# Patient Record
Sex: FEMALE | Race: WHITE | HISPANIC OR LATINO | Employment: FULL TIME | URBAN - METROPOLITAN AREA
[De-identification: names, ages, dates, MRNs, and addresses within clinical notes are randomized per-mention and may not be internally consistent; named-entity substitution may affect disease eponyms.]

---

## 2017-11-07 ENCOUNTER — HOSPITAL ENCOUNTER (OUTPATIENT)
Facility: MEDICAL CENTER | Age: 57
End: 2017-11-07
Payer: COMMERCIAL

## 2017-11-07 LAB
ANION GAP SERPL CALC-SCNC: 9 MMOL/L (ref 0–11.9)
BUN SERPL-MCNC: 10 MG/DL (ref 8–22)
CALCIUM SERPL-MCNC: 9 MG/DL (ref 8.5–10.5)
CHLORIDE SERPL-SCNC: 104 MMOL/L (ref 96–112)
CHOLEST SERPL-MCNC: 158 MG/DL (ref 100–199)
CO2 SERPL-SCNC: 27 MMOL/L (ref 20–33)
CREAT SERPL-MCNC: 0.44 MG/DL (ref 0.5–1.4)
GFR SERPL CREATININE-BSD FRML MDRD: >60 ML/MIN/1.73 M 2
GLUCOSE SERPL-MCNC: 83 MG/DL (ref 65–99)
HDLC SERPL-MCNC: 54 MG/DL
LDLC SERPL CALC-MCNC: 94 MG/DL
POTASSIUM SERPL-SCNC: 3.8 MMOL/L (ref 3.6–5.5)
SODIUM SERPL-SCNC: 140 MMOL/L (ref 135–145)
TRIGL SERPL-MCNC: 52 MG/DL (ref 0–149)

## 2020-06-08 ENCOUNTER — OFFICE VISIT (OUTPATIENT)
Dept: URGENT CARE | Facility: PHYSICIAN GROUP | Age: 60
End: 2020-06-08
Payer: COMMERCIAL

## 2020-06-08 VITALS
OXYGEN SATURATION: 95 % | WEIGHT: 161 LBS | RESPIRATION RATE: 16 BRPM | HEIGHT: 60 IN | HEART RATE: 66 BPM | TEMPERATURE: 97 F | DIASTOLIC BLOOD PRESSURE: 88 MMHG | SYSTOLIC BLOOD PRESSURE: 130 MMHG | BODY MASS INDEX: 31.61 KG/M2

## 2020-06-08 DIAGNOSIS — H10.9 BACTERIAL CONJUNCTIVITIS OF RIGHT EYE: ICD-10-CM

## 2020-06-08 PROCEDURE — 99214 OFFICE O/P EST MOD 30 MIN: CPT | Performed by: PHYSICIAN ASSISTANT

## 2020-06-08 RX ORDER — POLYMYXIN B SULFATE AND TRIMETHOPRIM 1; 10000 MG/ML; [USP'U]/ML
1 SOLUTION OPHTHALMIC EVERY 4 HOURS
Qty: 10 ML | Refills: 0 | Status: SHIPPED | OUTPATIENT
Start: 2020-06-08 | End: 2022-07-05

## 2020-06-08 ASSESSMENT — ENCOUNTER SYMPTOMS
SORE THROAT: 0
EYE PAIN: 0
HEADACHES: 0
EYE REDNESS: 1
CARDIOVASCULAR NEGATIVE: 1
DOUBLE VISION: 0
FEVER: 0
PHOTOPHOBIA: 0
COUGH: 0
EYE DISCHARGE: 1

## 2020-06-08 ASSESSMENT — VISUAL ACUITY: OU: 1

## 2020-06-08 NOTE — PATIENT INSTRUCTIONS
Conjuntivitis bacteriana  (Bacterial Conjunctivitis)  La conjuntivitis bacteriana es jozef infección de la membrana transparente que cubre la parte rosendo del doreen y la saturnino interna del párpado (conjuntiva). Cuando los vasos sanguíneos de la conjuntiva se inflaman, el doreen se pone fraser o deyvi, y es posible que le pique. La conjuntivitis bacteriana se transmite fácilmente de jozef persona a la otra (es contagiosa). También se contagia fácilmente de un doreen al otro.  CAUSAS  La causa de esta afección son varias bacterias comunes. Puede contraer la infección si entra en contacto con otra persona que está infectada. También puede entrar en contacto con elementos que estén contaminados con la bacteria, gayle jozef toalla para la saturnino, solución para lentes de contacto o maquillaje para ojos.  FACTORES DE RIESGO  Es más probable que esta afección se manifieste en las personas que:  · Mantienen contacto físico con personas que tienen la infección.  · Usan lentes de contacto.  · Tienen sinusitis.  · Munoz tenido jozef lesión o cirugía reciente en el doreen.  · Tiene debilitado el sistema de defensa del organismo (sistema inmunitario).  · Tienen jozef afección médica que causa sequedad en los ojos.  SÍNTOMAS  Los síntomas de esta afección incluyen lo siguiente:  · Ojos rojos.  · Lagrimeo u ojos llorosos.  · Picazón en los ojos.  · Sensación de ardor en los ojos.  · Secreción espesa y amarillenta del doreen. Esta secreción puede convertirse en jozef costra en el párpado sandi la noche, que hace que los párpados se peguen.  · Hinchazón de los párpados.  · Visión borrosa.  DIAGNÓSTICO  El médico puede diagnosticar esta afección en función de los síntomas y los antecedentes médicos. El médico también puede obtener jozef muestra de la secreción del doreen para averiguar la causa de la infección. [place] no se hace con frecuencia.  TRATAMIENTO  El tratamiento de esta afección incluye lo siguiente:  · Gotas o ungüento para los ojos con antibiótico para erradicar  la infección con más rapidez y evitar el contagio a otras personas.  · Antibióticos por vía oral para tratar infecciones que no responden a las gotas o los ungüentos, o que plaza más de 10 días.  · Paños húmedos fríos (compresas frías) sobre los ojos.  · Lágrimas artificiales aplicadas 2 a 6 veces por día.  INSTRUCCIONES PARA EL CUIDADO EN EL HOGAR  Medicamentos   · [place] los antibióticos o aplíqueselos gayle se lo haya indicado el médico. No deje de bahman los antibióticos o de aplicárselos aunque comience a sentirse mejor.  · [place] o aplíquese los medicamentos de venta joseph y recetados solamente gayle se lo haya indicado el médico.  · Tenga mucho cuidado de no tocar el borde del párpado con el frasco de las gotas para los ojos o el tubo del ungüento cuando aplica los medicamentos en el doreen afectado. Emet evitará que se contagie la infección al otro doreen o a otras personas.  Control de las molestias   · Retire suavemente la secreción de los ojos con un paño tibio y húmedo o con jozef torunda de algodón.  · Aplíquese un paño frío y limpio en el doreen sandi 10 a 20 minutos, 3 a 4 veces al día.  Instrucciones generales   · No use lentes de contacto hasta que haya desaparecido la inflamación y mead médico le indique que es seguro usarlos nuevamente. Pregúntele al médico cómo esterilizar o reemplazar namita lentes de contacto antes de usarlos nuevamente. Use anteojos hasta que pueda volver a usar los lentes de contacto.  · Evite usar maquillaje en los ojos hasta que la inflamación se haya crissy. Descarte cosméticos viejos para los ojos que puedan estar contaminados.  · Cambie o lave mead almohada todos los días.  · No comparta las toallas o los paños. Emet puede propagar la infección.  · Lave namita shayy frecuentemente con agua y jabón. Use toallas de papel para secarse las shayy.  · Evite tocarse o frotarse los ojos.  · No conduzca ni use maquinaria pesada si mead visión es borrosa.  SOLICITE ATENCIÓN MÉDICA SI:  · Tiene fiebre.  · Los  síntomas no mejoran después de 10 días de tratamiento.  SOLICITE ATENCIÓN MÉDICA DE INMEDIATO SI:  · Tiene fiebre y los síntomas empeoran repentinamente.  · Siente dolor intenso cuando mueve el doreen.  · Siente dolor u observa hinchazón o enrojecimiento en la saturnino.  · Pierde la visión repentinamente.  Esta información no tiene gayle fin reemplazar el consejo del médico. Asegúrese de hacerle al médico cualquier pregunta que tenga.  Document Released: 09/27/2006 Document Revised: 04/10/2017 Document Reviewed: 09/29/2016  Elsevier Interactive Patient Education © 2017 [employer]

## 2020-06-08 NOTE — PROGRESS NOTES
Subjective:      Sarah Schwartz is a 59 y.o. female who presents with Eye Problem (both eye, began on wendesday with just red eyes and now they are itchy and closed shut )        HPI  Patient is a 59-year-old female who presents with bilateral eye redness, yellow drainage, and itching onset 5 to 6 days ago.  Started with her right eye which has worsened and then moved to her left eye several days ago.  Right eye is worse with worsening redness and yellow drainage.  Her left eye has mild redness and itchiness.  She has mild blurry vision because of the drainage.  Otherwise she denies any other vision changes.  Denies any headache, fevers, chills.  Mild right eye discomfort.  She denies any pain.      Review of Systems   Constitutional: Negative for fever and malaise/fatigue.   HENT: Negative.  Negative for congestion and sore throat.    Eyes: Positive for discharge and redness. Negative for double vision, photophobia and pain.   Respiratory: Negative for cough.    Cardiovascular: Negative.    Neurological: Negative for headaches.   All other systems reviewed and are negative.         Objective:     /88   Pulse 66   Temp 36.1 °C (97 °F) (Tympanic)   Resp 16   Ht 1.524 m (5')   Wt 73 kg (161 lb)   SpO2 95%   Breastfeeding No   BMI 31.44 kg/m²      Physical Exam  Vitals signs reviewed.   Constitutional:       General: She is not in acute distress.     Appearance: Normal appearance. She is not ill-appearing or toxic-appearing.   HENT:      Right Ear: Tympanic membrane normal.      Left Ear: Tympanic membrane normal.   Eyes:      General: Lids are normal. Vision grossly intact.         Right eye: Discharge present. No foreign body or hordeolum.         Left eye: No foreign body, discharge or hordeolum.      Extraocular Movements: Extraocular movements intact.      Conjunctiva/sclera:      Right eye: Right conjunctiva is injected. Exudate present.      Left eye: Left conjunctiva is injected. No exudate.      Pupils: Pupils are equal, round, and reactive to light.   Cardiovascular:      Rate and Rhythm: Normal rate.   Pulmonary:      Effort: Pulmonary effort is normal.   Skin:     General: Skin is warm and dry.   Neurological:      General: No focal deficit present.      Mental Status: She is alert and oriented to person, place, and time.   Psychiatric:         Mood and Affect: Mood normal.         Behavior: Behavior normal.       History reviewed. No pertinent past medical history. History reviewed. No pertinent surgical history.   Social History     Socioeconomic History   • Marital status:      Spouse name: Not on file   • Number of children: Not on file   • Years of education: Not on file   • Highest education level: Not on file   Occupational History   • Not on file   Social Needs   • Financial resource strain: Not on file   • Food insecurity     Worry: Not on file     Inability: Not on file   • Transportation needs     Medical: Not on file     Non-medical: Not on file   Tobacco Use   • Smoking status: Not on file   Substance and Sexual Activity   • Alcohol use: Not on file   • Drug use: Not on file   • Sexual activity: Not on file   Lifestyle   • Physical activity     Days per week: Not on file     Minutes per session: Not on file   • Stress: Not on file   Relationships   • Social connections     Talks on phone: Not on file     Gets together: Not on file     Attends Buddhism service: Not on file     Active member of club or organization: Not on file     Attends meetings of clubs or organizations: Not on file     Relationship status: Not on file   • Intimate partner violence     Fear of current or ex partner: Not on file     Emotionally abused: Not on file     Physically abused: Not on file     Forced sexual activity: Not on file   Other Topics Concern   • Not on file   Social History Narrative   • Not on file    Patient has no known allergies.            Assessment/Plan:     1. Bacterial conjunctivitis of  right eye    - polymixin-trimethoprim (POLYTRIM) 22611-8.1 UNIT/ML-% Solution; Place 1 Drop in both eyes every 4 hours.  Dispense: 10 mL; Refill: 0    Discussed with patient signs and symptoms consistent with bacterial conjunctivitis of her right eye.  Discussed mild redness to the left eye, may be allergy related.  Treatment Polytrim eyedrops.  She may try nonsedating oral antihistamine in the morning such as Claritin or Zyrtec.  She may try antihistamine eyedrops separate from her antibiotic eyedrops.     Instructed to return to the urgent care if symptoms are not improving in 2 days or sooner if any worsening symptoms such as worsening redness, drainage, fevers, chills, vision changes, pain with eye movement, swelling or any other concerns.    Discharge instructions handed to patient.    Supportive care, differential diagnoses, and indications for immediate follow-up discussed with patient.    Pathogenesis of diagnosis discussed including typical length and natural progression. Patient expresses understanding and agrees to plan.    Please note that this dictation was created using voice recognition software. I have made every reasonable attempt to correct obvious errors, but I expect that there are errors of grammar and possibly content that I did not discover before finalizing the note.

## 2020-06-09 ENCOUNTER — OFFICE VISIT (OUTPATIENT)
Dept: URGENT CARE | Facility: PHYSICIAN GROUP | Age: 60
End: 2020-06-09
Payer: COMMERCIAL

## 2020-06-09 VITALS
TEMPERATURE: 97.7 F | OXYGEN SATURATION: 96 % | HEART RATE: 55 BPM | HEIGHT: 60 IN | SYSTOLIC BLOOD PRESSURE: 160 MMHG | WEIGHT: 160 LBS | DIASTOLIC BLOOD PRESSURE: 78 MMHG | RESPIRATION RATE: 16 BRPM | BODY MASS INDEX: 31.41 KG/M2

## 2020-06-09 DIAGNOSIS — S86.912A KNEE STRAIN, LEFT, INITIAL ENCOUNTER: ICD-10-CM

## 2020-06-09 PROCEDURE — 99214 OFFICE O/P EST MOD 30 MIN: CPT | Performed by: NURSE PRACTITIONER

## 2020-06-09 ASSESSMENT — ENCOUNTER SYMPTOMS
BACK PAIN: 0
DIZZINESS: 0
CHILLS: 0
NAUSEA: 1
HEARTBURN: 1
FOCAL WEAKNESS: 0
FEVER: 0
TINGLING: 0
FALLS: 0

## 2020-06-09 NOTE — PROGRESS NOTES
Subjective:   Sarah Schwartz  is a 59 y.o. female who presents for Knee Pain (L Knee pain, difficulty bending knee, unable to apply pressure, x5 days )        Knee Pain   This is a new problem. Episode onset: 4 months ago. The problem occurs intermittently. The problem has been gradually worsening. Associated symptoms include nausea. Pertinent negatives include no chills or fever. Associated symptoms comments: 59-year-old female patient reports to urgent care for new problem that started about 4 months ago.  Patient states that she twisted her left ankle while walking and has been babying that ankle causing some left knee pain.  Denies any mechanism of injury, fall, click or pop at the time.  Patient states that she does have more of a difficult time bending it and applying pressure to the area.  Is currently not taking anything over-the-counter for symptoms.  Denies any numbness or tingling denies any instability.  Patient states that she does occasionally feel a pop while walking down stairs in her left knee..     Review of Systems   Constitutional: Positive for malaise/fatigue. Negative for chills and fever.   Gastrointestinal: Positive for heartburn and nausea.   Musculoskeletal: Positive for joint pain. Negative for back pain and falls.   Neurological: Negative for dizziness, tingling and focal weakness.     History reviewed. No pertinent past medical history. History reviewed. No pertinent surgical history.   Social History     Socioeconomic History   • Marital status:      Spouse name: Not on file   • Number of children: Not on file   • Years of education: Not on file   • Highest education level: Not on file   Occupational History   • Not on file   Social Needs   • Financial resource strain: Not on file   • Food insecurity     Worry: Not on file     Inability: Not on file   • Transportation needs     Medical: Not on file     Non-medical: Not on file   Tobacco Use   • Smoking status: Never Smoker   •  Smokeless tobacco: Never Used   Substance and Sexual Activity   • Alcohol use: Not Currently   • Drug use: Not on file   • Sexual activity: Not on file   Lifestyle   • Physical activity     Days per week: Not on file     Minutes per session: Not on file   • Stress: Not on file   Relationships   • Social connections     Talks on phone: Not on file     Gets together: Not on file     Attends Taoist service: Not on file     Active member of club or organization: Not on file     Attends meetings of clubs or organizations: Not on file     Relationship status: Not on file   • Intimate partner violence     Fear of current or ex partner: Not on file     Emotionally abused: Not on file     Physically abused: Not on file     Forced sexual activity: Not on file   Other Topics Concern   • Not on file   Social History Narrative   • Not on file    Patient has no known allergies.       Objective:   /78 (BP Location: Left arm, Patient Position: Sitting, BP Cuff Size: Adult)   Pulse (!) 55   Temp 36.5 °C (97.7 °F) (Temporal)   Resp 16   Ht 1.524 m (5')   Wt 72.6 kg (160 lb)   SpO2 96%   BMI 31.25 kg/m²   Physical Exam  Vitals signs reviewed.   Constitutional:       Appearance: Normal appearance.   Cardiovascular:      Rate and Rhythm: Normal rate and regular rhythm.      Heart sounds: Normal heart sounds.   Pulmonary:      Effort: Pulmonary effort is normal.      Breath sounds: Normal breath sounds.   Musculoskeletal:      Left knee: She exhibits decreased range of motion. She exhibits no swelling, no effusion, no ecchymosis, no deformity, no laceration, no erythema, normal alignment, no LCL laxity, no bony tenderness, normal meniscus and no MCL laxity. Tenderness found. Medial joint line tenderness noted. No lateral joint line and no patellar tendon tenderness noted.      Comments: Left knee:   - Pain with valgus stress, no pain with varus stress  - No laxity noted  - No STS, bony tenderness, crepitus or bony  tenderness noted  - Anterior drawer negative   Skin:     General: Skin is warm.      Capillary Refill: Capillary refill takes less than 2 seconds.   Neurological:      Mental Status: She is alert and oriented to person, place, and time.   Psychiatric:         Mood and Affect: Mood normal.         Behavior: Behavior normal.         Thought Content: Thought content normal.         Judgment: Judgment normal.           Assessment/Plan:        1. Knee strain, left, initial encounter  - REFERRAL TO SPORTS MEDICINE    Discussed physical examination findings are consistent with a left knee strain due to to a left ankle sprain.  Will provide patient with a hinged knee brace for support and instructed her on RICE therapy including rest, ice, compression and elevation.  Provided patient with work note for the next 3 days and referral to sports medicine for further work-up and evaluation.  We will also provide patient an Ace wrap for her ankle as sometimes she feels like it is a little bit weak but is not currently having pain.    Supportive care, differential diagnoses, and indications for immediate follow-up discussed with patient.    Pathogenesis of diagnosis discussed including typical length and natural progression. Patient expresses understanding and agrees to plan.    Instructed patient to return to clinic for worsening symptoms or symptoms that persist for 7 to 10 days     Please note that this dictation was created using voice recognition software. I have made every reasonable attempt to correct obvious errors, but I expect that there are errors of grammar and possibly content that I did not discover before finalizing the note.

## 2020-06-09 NOTE — LETTER
June 9, 2020         Patient: Sarah Schwartz   YOB: 1960   Date of Visit: 6/9/2020           To Whom it May Concern:    Sarah Schwartz was seen in my clinic on 6/9/2020. She may return to work on 06/12/2020    If you have any questions or concerns, please don't hesitate to call.        Sincerely,           TEX Tiwari.  Electronically Signed

## 2020-06-15 ENCOUNTER — OFFICE VISIT (OUTPATIENT)
Dept: URGENT CARE | Facility: PHYSICIAN GROUP | Age: 60
End: 2020-06-15
Payer: COMMERCIAL

## 2020-06-15 VITALS
TEMPERATURE: 98.1 F | OXYGEN SATURATION: 96 % | HEART RATE: 56 BPM | WEIGHT: 167.2 LBS | BODY MASS INDEX: 32.83 KG/M2 | DIASTOLIC BLOOD PRESSURE: 88 MMHG | RESPIRATION RATE: 16 BRPM | HEIGHT: 60 IN | SYSTOLIC BLOOD PRESSURE: 158 MMHG

## 2020-06-15 DIAGNOSIS — Z86.69 HISTORY OF CATARACT: ICD-10-CM

## 2020-06-15 DIAGNOSIS — H10.13 ALLERGIC CONJUNCTIVITIS OF BOTH EYES: ICD-10-CM

## 2020-06-15 PROCEDURE — 99214 OFFICE O/P EST MOD 30 MIN: CPT | Performed by: NURSE PRACTITIONER

## 2020-06-15 RX ORDER — OLOPATADINE HYDROCHLORIDE 2 MG/ML
1 SOLUTION/ DROPS OPHTHALMIC DAILY
Qty: 2.5 ML | Refills: 0 | Status: SHIPPED | OUTPATIENT
Start: 2020-06-15 | End: 2022-07-05

## 2020-06-15 RX ORDER — CETIRIZINE HYDROCHLORIDE 10 MG/1
10 TABLET ORAL DAILY
Qty: 30 TAB | Refills: 0 | Status: SHIPPED | OUTPATIENT
Start: 2020-06-15 | End: 2022-07-05

## 2020-06-15 ASSESSMENT — ENCOUNTER SYMPTOMS
NAUSEA: 0
EYE DISCHARGE: 1
EYE ITCHING: 1
FEVER: 0
EYE REDNESS: 1
VOMITING: 0
FOREIGN BODY SENSATION: 1
DOUBLE VISION: 0
BLURRED VISION: 0
PHOTOPHOBIA: 0

## 2020-06-16 ASSESSMENT — ENCOUNTER SYMPTOMS
SHORTNESS OF BREATH: 0
WHEEZING: 0

## 2020-06-16 NOTE — PROGRESS NOTES
Subjective:     Sarah Schwartz is a 59 y.o. female who presents for Eye Problem (L eye redness, R eye feels like something is in eye, x2 days )      Redness started last week in the right eye. Seen on Monday and started eye drops. States the medication were working well, then redness started in left eye on Saturday. No vision changes. Eyes are itchy. States it flet like sand in her eye right eye. Medications helped discharge. Eye redness started when she started working a new job. 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 Irritated by cig smoke, initially was sneezing a lot. No hx of seasonal allergies.     uses for exam and discharge instructions.     Eye Problem    Both eyes are affected.This is a new problem. There was no injury mechanism. There is no known exposure to pink eye. She does not wear contacts. Associated symptoms include an eye discharge, eye redness, a foreign body sensation and itching. Pertinent negatives include no blurred vision, double vision, fever, nausea, photophobia, recent URI or vomiting. She has tried eye drops for the symptoms. The treatment provided mild relief.       History reviewed. No pertinent past medical history.    History reviewed. No pertinent surgical history.    Social History     Socioeconomic History   • Marital status:      Spouse name: Not on file   • Number of children: Not on file   • Years of education: Not on file   • Highest education level: Not on file   Occupational History   • Not on file   Social Needs   • Financial resource strain: Not on file   • Food insecurity     Worry: Not on file     Inability: Not on file   • Transportation needs     Medical: Not on file     Non-medical: Not on file   Tobacco Use   • Smoking status: Never Smoker   • Smokeless tobacco: Never Used   Substance and Sexual Activity   • Alcohol use: Not Currently   • Drug use: Not on file   • Sexual activity: Not on file   Lifestyle   •  Physical activity     Days per week: Not on file     Minutes per session: Not on file   • Stress: Not on file   Relationships   • Social connections     Talks on phone: Not on file     Gets together: Not on file     Attends Buddhism service: Not on file     Active member of club or organization: Not on file     Attends meetings of clubs or organizations: Not on file     Relationship status: Not on file   • Intimate partner violence     Fear of current or ex partner: Not on file     Emotionally abused: Not on file     Physically abused: Not on file     Forced sexual activity: Not on file   Other Topics Concern   • Not on file   Social History Narrative   • Not on file        History reviewed. No pertinent family history.     No Known Allergies    Review of Systems   Constitutional: Negative for fever.   HENT: Positive for congestion.    Eyes: Positive for discharge, redness and itching. Negative for blurred vision, double vision and photophobia.   Respiratory: Negative for shortness of breath and wheezing.    Gastrointestinal: Negative for nausea and vomiting.   Musculoskeletal: Positive for joint pain.   Skin: Negative for rash.   Endo/Heme/Allergies: Negative for environmental allergies.   All other systems reviewed and are negative.       Objective:   /88 (BP Location: Left arm, Patient Position: Sitting, BP Cuff Size: Adult)   Pulse (!) 56   Temp 36.7 °C (98.1 °F) (Temporal)   Resp 16   Ht 1.524 m (5')   Wt 75.8 kg (167 lb 3.2 oz)   SpO2 96%   BMI 32.65 kg/m²     Physical Exam  Vitals signs reviewed.   Constitutional:       General: She is not in acute distress.     Appearance: She is well-developed.   HENT:      Head: Normocephalic and atraumatic.      Right Ear: External ear normal.      Left Ear: External ear normal.      Nose: Mucosal edema present.      Mouth/Throat:      Mouth: Mucous membranes are moist.   Eyes:      General: Lids are normal.         Right eye: No foreign body or discharge.          Left eye: No foreign body or discharge.      Extraocular Movements: Extraocular movements intact.      Right eye: Normal extraocular motion and no nystagmus.      Left eye: Normal extraocular motion and no nystagmus.      Conjunctiva/sclera:      Right eye: Right conjunctiva is injected. No chemosis, exudate or hemorrhage.     Left eye: Left conjunctiva is injected. No chemosis, exudate or hemorrhage.     Pupils: Pupils are equal, round, and reactive to light. Pupils are equal.      Right eye: No corneal abrasion or fluorescein uptake.      Left eye: No corneal abrasion or fluorescein uptake.      Comments: Cataract rt eye. Greatest injection to lower sclera. Clear limbus bilaterally.     Snyder lamp exam showed no fluorecein uptake.   Neck:      Musculoskeletal: Normal range of motion.   Cardiovascular:      Rate and Rhythm: Normal rate.   Pulmonary:      Effort: Pulmonary effort is normal.   Musculoskeletal: Normal range of motion.      Left knee: Tenderness found.      Comments: Left knee brace.    Skin:     General: Skin is warm and dry.      Findings: No rash.   Neurological:      General: No focal deficit present.      Mental Status: She is alert and oriented to person, place, and time.      GCS: GCS eye subscore is 4. GCS verbal subscore is 5. GCS motor subscore is 6.   Psychiatric:         Mood and Affect: Mood normal.         Speech: Speech normal.         Behavior: Behavior normal.         Thought Content: Thought content normal.         Judgment: Judgment normal.         Assessment/Plan:   1. Allergic conjunctivitis of both eyes  - REFERRAL TO FOLLOW-UP WITH PRIMARY CARE  - cetirizine (ZYRTEC ALLERGY) 10 MG Tab; Take 1 Tab by mouth every day.  Dispense: 30 Tab; Refill: 0  - olopatadine HCl (PATADAY) 0.2 % ophthalmic solution; Place 1 Drop in both eyes every day.  Dispense: 2.5 mL; Refill: 0  - REFERRAL TO OPHTHALMOLOGY    2. History of cataract  - REFERRAL TO OPHTHALMOLOGY  -Visual acuity  20/25  -Eyelid and hand hygiene.  -Artificial tears to moisturize eyes.    Follow up for persistent or worsening symptoms, increased redness or swelling, vision changes, decreased eye movement, new or increased pain, or fever. Can continue antibiotic drops in rt eye x 5 days. Follow up with primary care provider. Follow up with previous sport's medicine referral for left knee pain.     Differential diagnosis, natural history, supportive care, and indications for immediate follow-up discussed.

## 2020-06-16 NOTE — PATIENT INSTRUCTIONS
Conjuntivitis alérgica  (Allergic Conjunctivitis)  La conjuntivitis alérgica es la inflamación de la membrana transparente que cubre la parte rosendo del odreen y la saturnino interna del párpado (conjuntiva), y mead causa son las alergias. Los vasos sanguíneos de la conjuntiva se inflaman, lo que hace que el doreen se torne de color frsaer o deyvi, y a menudo causa picazón en el doreen. La conjuntivitis alérgica no se transmite de jozef persona a la otra (no es contagiosa).  CAUSAS  La causa de esta afección es jozef reacción alérgica. Entre las causas comunes de jozef reacción alérgica (alérgenos) se incluyen las siguientes:  · Polvo.  · Polen.  · Moho.  · Caspa o secreciones de los animales.  FACTORES DE RIESGO  Es más probable que aparezca esta afección si está expuesto a altos niveles de los alérgenos que causan la reacción alérgica. [employer] puede incluir estar al aire joseph cuando los niveles de polen en el aire son elevados o cerca de los animales a los cuales es alérgico.  SÍNTOMAS  Los síntomas de esta afección pueden incluir lo siguiente:  · Enrojecimiento ocular.  · Secreción lagrimal de los ojos.  · Ojos llorosos.  · Picazón de los ojos.  · Sensación de ardor en los ojos.  · Secreción transparente de los ojos.  · Hinchazón de los párpados.  DIAGNÓSTICO  Purnima trastorno se puede diagnosticar mediante la historia clínica y un examen físico. Si tiene secreción de los ojos, se la puede analizar para descartar otras causas de la conjuntivitis.  TRATAMIENTO  El tratamiento para esta afección suele incluir medicamentos, que pueden ser gotas oftálmicas, ungüentos o medicamentos por vía oral. Pueden ser recetados o de venta joseph.  INSTRUCCIONES PARA EL CUIDADO EN EL HOGAR  · [place] o aplíquese los medicamentos solamente gayle se lo haya indicado el médico.  · No se toque ni se frote los ojos.  · No use lentes de contacto hasta que la inflamación haya desaparecido. En cambio, use anteojos.  · No use maquillaje en los ojos hasta que la  "inflamación haya desaparecido.  · Aplíquese un paño limpio y frío en el doreen sandi 10 a 20 minutos, 3 a 4 veces por día.  · Trate de evitar el alérgeno que le esté causando la reacción alérgica.  SOLICITE ATENCIÓN MÉDICA SI:  · Los síntomas empeoran.  · Le supura pus del doreen.  · Aparecen nuevos síntomas.  · Tiene fiebre.  Esta información no tiene anastasiia fin reemplazar el consejo del médico. Asegúrese de hacerle al médico cualquier pregunta que tenga.  Document Released: 12/18/2006 Document Revised: 01/08/2016  [employer] Interactive Patient Education © 2017 [employer]  Conjuntivitis  (Conjunctivitis)  Usted padece conjuntivitis. La conjuntivitis se conoce frecuentemente anastasiia \"doreen fraser\". Las causas de la conjuntivitis pueden ser las infecciones virales o bacterianas, alergias o lesiones. Los síntomas son: enrojecimiento de la superficie del doreen, picazón, molestias y en algunos casos, secreciones. La secreción se deposita en las pestañas. Las infecciones virales causan jozef secreción acuosa, mientras que las infecciones bacterianas causan jozef secreción amarillenta y espesa. La conjuntivitis es muy contagiosa y se disemina por el contacto directo.  Anastasiia parte del tratamiento le indicaran gotas oftálmicas con antibióticos. Antes de utilizar el medicamento, retire todas la secreciones del doreen, lavándolo suavemente con agua tibia y algodón. Continúe con el uso del medicamento hasta que se haya despertado dos mañanas sin secreción ocular. No se frote los ojos. [place] hace que aumente la irritación y favorece la extensión de la infección. No utilice las mismas toallas que los miembros de mead bhavana. Lávese las shayy con agua y jabón antes y después de tocarse los ojos. Utilice compresas frías para reducir el dolor y anteojos de sol para disminuir la irritación que ocasiona la denise. No debe usarse maquillaje ni lentes de contacto hasta que la infección haya desaparecido.  SOLICITE ATENCIÓN MÉDICA SI:  · Priscila síntomas no mejoran " luego de 3 días de tratamiento.  · Aumenta el dolor o las dificultades para elsie.  · La dulce externa de los párpados está muy arik o hinchada.  Document Released: 12/18/2006 Document Revised: 03/11/2013  [employer]® Patient Information ©2014 [employer].    Rinitis alérgica  (Allergic Rhinitis)  La rinitis alérgica ocurre cuando las membranas mucosas de la nariz responden a los alérgenos. Los alérgenos son las partículas que están en el aire y que hacen que el cuerpo tenga jozef reacción alérgica. [place] hace que usted libere anticuerpos alérgicos. A través de jozef crandall de eventos, estos finalmente hacen que usted libere histamina en la corriente sanguínea. Aunque la función de la histamina es proteger al organismo, es esta liberación de histamina lo que provoca malestar, gayle los estornudos frecuentes, la congestión y goteo y picazón nasales.  CAUSAS  La causa de la rinitis alérgica estacional (fiebre del heno) son los alérgenos del polen que pueden provenir del césped, los árboles y la maleza. La causa de la rinitis alérgica permanente (rinitis alérgica perenne) son los alérgenos, gayle los ácaros del polvo doméstico, la caspa de las mascotas y las esporas del moho.  SÍNTOMAS  · Secreción nasal (congestión).  · Goteo y picazón nasales con estornudos y lagrimeo.  DIAGNÓSTICO  Mead médico puede ayudarlo a determinar el alérgeno o los alérgenos que desencadenan namita síntomas. Si usted y mead médico no pueden determinar cuál es el alérgeno, pueden hacerse análisis de marty o estudios de la piel. El médico diagnosticará la afección después de hacerle jozef historia clínica y un examen físico. Además, puede evaluarlo para detectar la presencia de otras enfermedades afines, gayle asma, conjuntivitis u otitis.  TRATAMIENTO  La rinitis alérgica no tiene gilberto, jomar puede controlarse con lo siguiente:  · Medicamentos que inhiben los síntomas de alergia, por ejemplo, vacunas contra la alergia, aerosoles nasales y antihistamínicos por vía  oral.  · Evitar el alérgeno.  La fiebre del heno a menudo puede tratarse con antihistamínicos en las formas de píldoras o aerosol nasal. Los antihistamínicos bloquean los efectos de la histamina. Existen medicamentos de venta joseph que pueden ayudar con la congestión nasal y la hinchazón alrededor de los ojos. Consulte a mead médico antes de bahman o administrarse lisa medicamento.  Si la prevención del alérgeno o el medicamento recetado no dan resultado, existen muchos medicamentos nuevos que mead médico puede recetarle. Pueden usarse medicamentos más pilar si las medidas iniciales no son efectivas. Pueden aplicarse inyecciones desensibilizantes si los medicamentos y la prevención no funcionan. La desensibilización ocurre cuando un paciente recibe vacunas constantes hasta que el cuerpo se vuelve menos sensible al alérgeno. Asegúrese de realizar un seguimiento con mead médico si los problemas continúan.  INSTRUCCIONES PARA EL CUIDADO EN EL HOGAR  No es posible evitar por completo los alérgenos, jomar puede reducir los síntomas al bahman medidas para limitar mead exposición a ellos. Es muy útil saber exactamente a qué es alérgico para que pueda evitar namita desencadenantes específicos.  SOLICITE ATENCIÓN MÉDICA SI:  · Tiene fiebre.  · Desarrolla jozef tos que no cesa fácilmente (persistente).  · Le falta el aire.  · Comienza a tener sibilancias.  · Los síntomas interfieren con las actividades diarias normales.  Esta información no tiene gayle fin reemplazar el consejo del médico. Asegúrese de hacerle al médico cualquier pregunta que tenga.  Document Released: 09/27/2006 Document Revised: 01/08/2016 Document Reviewed: 08/25/2014  [employer]vier Interactive Patient Education © 2017 [employer]vier Inc.

## 2020-07-31 ENCOUNTER — OFFICE VISIT (OUTPATIENT)
Dept: URGENT CARE | Facility: PHYSICIAN GROUP | Age: 60
End: 2020-07-31
Payer: COMMERCIAL

## 2020-07-31 ENCOUNTER — HOSPITAL ENCOUNTER (OUTPATIENT)
Facility: MEDICAL CENTER | Age: 60
End: 2020-07-31
Attending: PHYSICIAN ASSISTANT
Payer: COMMERCIAL

## 2020-07-31 VITALS
OXYGEN SATURATION: 98 % | RESPIRATION RATE: 16 BRPM | TEMPERATURE: 97.9 F | BODY MASS INDEX: 32.79 KG/M2 | DIASTOLIC BLOOD PRESSURE: 82 MMHG | HEART RATE: 78 BPM | WEIGHT: 167 LBS | HEIGHT: 60 IN | SYSTOLIC BLOOD PRESSURE: 138 MMHG

## 2020-07-31 DIAGNOSIS — R39.89 SUSPECTED UTI: ICD-10-CM

## 2020-07-31 DIAGNOSIS — H10.12 ACUTE ALLERGIC CONJUNCTIVITIS OF LEFT EYE: ICD-10-CM

## 2020-07-31 DIAGNOSIS — H11.32 SUBCONJUNCTIVAL HEMORRHAGE, LEFT: ICD-10-CM

## 2020-07-31 LAB
APPEARANCE UR: NORMAL
BILIRUB UR STRIP-MCNC: NORMAL MG/DL
COLOR UR AUTO: YELLOW
GLUCOSE UR STRIP.AUTO-MCNC: NORMAL MG/DL
KETONES UR STRIP.AUTO-MCNC: NORMAL MG/DL
LEUKOCYTE ESTERASE UR QL STRIP.AUTO: NORMAL
NITRITE UR QL STRIP.AUTO: NORMAL
PH UR STRIP.AUTO: 7.5 [PH] (ref 5–8)
PROT UR QL STRIP: NORMAL MG/DL
RBC UR QL AUTO: NORMAL
SP GR UR STRIP.AUTO: 1.01
UROBILINOGEN UR STRIP-MCNC: 0.2 MG/DL

## 2020-07-31 PROCEDURE — 99000 SPECIMEN HANDLING OFFICE-LAB: CPT | Performed by: PHYSICIAN ASSISTANT

## 2020-07-31 PROCEDURE — 87086 URINE CULTURE/COLONY COUNT: CPT

## 2020-07-31 PROCEDURE — 87186 SC STD MICRODIL/AGAR DIL: CPT

## 2020-07-31 PROCEDURE — 87077 CULTURE AEROBIC IDENTIFY: CPT

## 2020-07-31 PROCEDURE — 99214 OFFICE O/P EST MOD 30 MIN: CPT | Performed by: PHYSICIAN ASSISTANT

## 2020-07-31 PROCEDURE — 81002 URINALYSIS NONAUTO W/O SCOPE: CPT | Performed by: PHYSICIAN ASSISTANT

## 2020-07-31 RX ORDER — NITROFURANTOIN 25; 75 MG/1; MG/1
100 CAPSULE ORAL EVERY 12 HOURS
Qty: 10 CAP | Refills: 0 | Status: SHIPPED | OUTPATIENT
Start: 2020-07-31 | End: 2020-08-05

## 2020-07-31 RX ORDER — PHENAZOPYRIDINE HYDROCHLORIDE 200 MG/1
200 TABLET, FILM COATED ORAL 3 TIMES DAILY
Qty: 6 TAB | Refills: 0 | Status: SHIPPED | OUTPATIENT
Start: 2020-07-31 | End: 2020-08-02

## 2020-07-31 ASSESSMENT — ENCOUNTER SYMPTOMS
EYE DISCHARGE: 0
DIARRHEA: 0
EYE PAIN: 0
DOUBLE VISION: 0
VOMITING: 0
CHILLS: 0
NAUSEA: 0
FEVER: 0
SORE THROAT: 0
PHOTOPHOBIA: 0
EYE REDNESS: 1
ABDOMINAL PAIN: 0
COUGH: 0
FLANK PAIN: 0
BLURRED VISION: 0

## 2020-07-31 ASSESSMENT — VISUAL ACUITY: OU: 1

## 2020-07-31 NOTE — PATIENT INSTRUCTIONS
Infección urinaria en los adultos  Urinary Tract Infection, Adult  Jozef infección urinaria (IU) puede ocurrir en cualquier lugar de las vías urinarias. Las vías urinarias incluyen lo siguiente:  · Los riñones.  · Los uréteres.  · La vejiga.  · La uretra.  Estos órganos fabrican, almacenan y eliminan el pis (orina) del cuerpo.  ¿Cuáles son las causas?  La causa es la presencia de gérmenes (bacterias) en la dulce genital. Estos gérmenes proliferan y causan hinchazón (inflamación) de las vías urinarias.  ¿Qué incrementa el riesgo?  Es más probable que contraiga esta afección si:  · Tiene colocado un tubo richardson y pequeño (catéter) para drenar el pis.  · No puede controlar la evacuación de pis ni de materia fecal (incontinencia).  · Es kristi y, además:  ? Usa estos métodos para evitar el embarazo:  ? Un medicamento que johnson los espermatozoides (espermicida).  ? Un dispositivo que impide el paso de los espermatozoides (diafragma).  ? Tiene niveles bajos de jozef hormona femenina (estrógeno).  ? Está embarazada.  · Tiene genes que aumentan mead riesgo.  · Es sexualmente activa.  · Rosenda antibióticos.  · Tiene dificultad para orinar debido a:  ? Mead próstata es más mira de lo normal, si usted es hombre.  ? Obstrucción en la parte del cuerpo que drena el pis de la vejiga (uretra).  ? Cálculo renal.  ? Un trastorno nervioso que afecta la vejiga (vejiga neurógena).  ? No adam jozef cantidad suficiente de líquido.  ? No hace pis con la frecuencia suficiente.  · Tiene otras afecciones, gayle:  ? Diabetes.  ? Un sistema que combate las enfermedades (sistema inmunitario) debilitado.  Hemorragia Subconjuntival  (Subconjunctival Hemorrhage)  Mead examen muestra que tiene jozef hemorragia subconjuntival. Es jozef acumulación de marty que cubre jozef parte de la dulce rosendo del doreen, y que no es de importancia. Purnima trastorno puede deberse a jozef lesión o esfuerzo (gayle al levantar peso, estornudar, o toser); a menudo no se puede conocer la  causa. La hemorragia subconjuntival no causa dolor ni problemas de visión.  Purnima trastorno no requiere ningún tratamiento. Llevará de 1 a 2 semanas para que la marty se disuelva. Si susan aspirina o coumadina a diario o si tiene feroz presión arterial, deberá consultar con mead médico acerca de la necesidad de realizar un mayor tratamiento. Comuníquese con mead médico si tiene problemas de visión, dolor alrededor del doreen, u otra consulta acerca de mead trastorno.  Document Released: 10/14/2008 Document Revised: 03/11/2013  [employer]® Patient Information ©2014 [employer].

## 2020-07-31 NOTE — PROGRESS NOTES
Subjective:   Sarah Schwartz  is a 59 y.o. female who presents for Eye Problem (began yesterday with a red eye possible pinkeye again ) and UTI (began yesterday, burning sensation when urinating )    Entire visit conducted with assistance of official  MACKENZIE John MA    This is a new problem. Patient presents to urgent care with 2 separate issues of concern:    1. Possible UTI: Patient reports 2-day history of dysuria, urgency and frequency with urination.  Denies hematuria or flank pain.  Denies fever or chills.  Patient does have prior history of UTI, no prior history of pyelonephritis.      2. Left eye redness. Patient reports new onset of redness of left eye yesterday. She states that when she touches the eye she can tell it's inflamed. No discharge, photosensitivity. Patient was seen in twice in June for conjunctivitis.  Initially, she was treated for bacterial conjunctivitis.  The second visit she was identified as possibly having allergic conjunctivitis and was treated with Pat[place].  At that time she was referred to ophthalmology however the patient has not gone to see them.  Patient reports that her symptoms did resolve and that this is a new issue that began yesterday.  Upon direct questioning, patient does admit to foreign body sensation the day prior to the onset of her redness which did cause her to rub at her eye frequently.  The foreign body sensation has since resolved.  Denies blurred vision.    Patient denies any significant joint pain or swelling.  She is sexually active with a single monogamous partner her .  She is not experiencing any vaginal discharge or concern for sexually transmitted infections.      Eye Problem    Associated symptoms include eye redness. Pertinent negatives include no blurred vision, eye discharge, double vision, fever, nausea, photophobia or vomiting.   UTI   Pertinent negatives include no abdominal pain, chills, congestion, coughing, fever, nausea, rash, sore  throat or vomiting.     Review of Systems   Constitutional: Negative for chills, fever and malaise/fatigue.   HENT: Negative for congestion, ear pain and sore throat.    Eyes: Positive for redness. Negative for blurred vision, double vision, photophobia, pain and discharge.   Respiratory: Negative for cough.    Gastrointestinal: Negative for abdominal pain, diarrhea, nausea and vomiting.   Genitourinary: Positive for dysuria, frequency and urgency. Negative for flank pain and hematuria.   Musculoskeletal: Negative for joint pain.   Skin: Negative for rash.   All other systems reviewed and are negative.    No Known Allergies  Reviewed past medical, surgical , social and family history.  Reviewed prescription and over-the-counter medications with patient and electronic health record today.     Objective:   /82 (BP Location: Right arm, Patient Position: Sitting, BP Cuff Size: Adult)   Pulse 78   Temp 36.6 °C (97.9 °F) (Tympanic)   Resp 16   Ht 1.524 m (5')   Wt 75.8 kg (167 lb)   SpO2 98%   Breastfeeding No   BMI 32.61 kg/m²   Physical Exam  Vitals signs reviewed.   Constitutional:       General: She is not in acute distress.     Appearance: She is well-developed. She is not ill-appearing or toxic-appearing.   HENT:      Head: Normocephalic and atraumatic.      Right Ear: Tympanic membrane, ear canal and external ear normal.      Left Ear: Tympanic membrane, ear canal and external ear normal.      Nose: Nose normal.      Mouth/Throat:      Lips: Pink. No lesions.      Mouth: Mucous membranes are moist.      Pharynx: Oropharynx is clear. Uvula midline. No oropharyngeal exudate.   Eyes:      General: Lids are normal. Lids are everted, no foreign bodies appreciated. Vision grossly intact.         Right eye: No discharge.         Left eye: No foreign body or discharge.      Extraocular Movements: Extraocular movements intact.      Conjunctiva/sclera:      Left eye: Hemorrhage present.      Pupils: Pupils are  equal, round, and reactive to light.      Left eye: No corneal abrasion or fluorescein uptake.      Funduscopic exam:     Right eye: No papilledema.         Left eye: No papilledema.        Comments: 1 drop of proparacaine mixed with fluorescein stain instilled into left eye.  Lid everted and swept with no foreign bodies noted.  No corneal abrasion noted   Neck:      Musculoskeletal: Normal range of motion and neck supple.   Cardiovascular:      Rate and Rhythm: Normal rate and regular rhythm.      Heart sounds: Normal heart sounds. No murmur. No friction rub. No gallop.    Pulmonary:      Effort: Pulmonary effort is normal. No respiratory distress.      Breath sounds: Normal breath sounds.   Abdominal:      General: Bowel sounds are normal. There is no distension.      Palpations: Abdomen is soft. There is no mass.      Tenderness: There is abdominal tenderness in the suprapubic area. There is no right CVA tenderness, left CVA tenderness, guarding or rebound.   Musculoskeletal: Normal range of motion.         General: No tenderness or deformity.   Lymphadenopathy:      Head:      Right side of head: No submental, submandibular, tonsillar, preauricular or posterior auricular adenopathy.      Left side of head: No submental, submandibular, tonsillar, preauricular or posterior auricular adenopathy.      Cervical: No cervical adenopathy.      Upper Body:      Right upper body: No supraclavicular adenopathy.      Left upper body: No supraclavicular adenopathy.   Skin:     General: Skin is warm and dry.      Findings: No rash.   Neurological:      Mental Status: She is alert and oriented to person, place, and time.      Cranial Nerves: Cranial nerves are intact. No cranial nerve deficit.      Sensory: Sensation is intact. No sensory deficit.      Motor: Motor function is intact.      Coordination: Coordination is intact. Coordination normal.      Gait: Gait is intact.   Psychiatric:         Attention and Perception:  Attention normal.         Mood and Affect: Mood and affect normal.         Speech: Speech normal.         Behavior: Behavior normal. Behavior is cooperative.         Thought Content: Thought content normal.         Judgment: Judgment normal.           Assessment/Plan:   1. Subconjunctival hemorrhage, left    2. Acute allergic conjunctivitis of left eye    3. Suspected UTI  - POCT Urinalysis  - Urine Culture; Future  - nitrofurantoin (MACROBID) 100 MG Cap; Take 1 Cap by mouth every 12 hours for 5 days.  Dispense: 10 Cap; Refill: 0  - phenazopyridine (PYRIDIUM) 200 MG Tab; Take 1 Tab by mouth 3 times a day for 2 days.  Dispense: 6 Tab; Refill: 0       Visual acuity 20/25 throughout.  Reassurance provided regarding subconjunctival hemorrhage.  Recommend follow-up with ophthalmology as previously ordered given continued intermittent ophthalmologic complaint.    Urinalysis is suspicious for UTI.  Patient will be placed on nitrofurantoin pending urine culture and sensitivity.  I did access the patient's most recent laboratory values and renal function was within normal limits therefore I believe it is reasonable to treat her with the nitrofurantoin.  She is also given Pyridium.    Upon entering exam room I ensured patient was wearing a mask.  This provider wore appropriate PPE throughout entire visit.  Patient wore mask entire visit except for a brief period while examining oropharynx.    Patient was seen in collaboration with FORD King PA-S    I have personally examined the patient, developed a differential diagnosis and established a treatment plan.       Differential diagnosis, natural history, supportive care, and indications for immediate follow-up discussed.     Red flag warning symptoms and strict ER/follow-up precautions given.  The patient demonstrated a good understanding and agreed with the treatment plan.  Please note that this note was created using voice recognition speech to text software. Every  effort has been made to correct obvious errors.  However, I expect there are errors of grammar and possibly context that were not discovered prior to finalizing the note  SDedra Lay PA-C

## 2020-07-31 NOTE — LETTER
July 31, 2020         Patient: Sarah Schwartz   YOB: 1960   Date of Visit: 7/31/2020           To Whom it May Concern:    Sarah Schwartz was seen in my clinic on 7/31/2020. She may return to work on 8/1/20. Patient does not have an infectious or communicable disease at this time.    If you have any questions or concerns, please don't hesitate to call.        Sincerely,           Molly Lay P.A.-C.  Electronically Signed

## 2020-08-03 LAB
BACTERIA UR CULT: ABNORMAL
BACTERIA UR CULT: ABNORMAL
SIGNIFICANT IND 70042: ABNORMAL
SITE SITE: ABNORMAL
SOURCE SOURCE: ABNORMAL

## 2020-08-06 ENCOUNTER — TELEPHONE (OUTPATIENT)
Dept: URGENT CARE | Facility: PHYSICIAN GROUP | Age: 60
End: 2020-08-06

## 2020-08-06 NOTE — TELEPHONE ENCOUNTER
----- Message from Molly Lay P.A.-C. sent at 8/3/2020 10:22 AM PDT -----  Regarding: urine culture  Please contact patient and notify urine culture positive for UTI with E. coli which is very common bacteria to cause urinary tract infections.  She is being treated with correct medication.  Recommend completion of antibiotic, no change to plan.  If not improving, recommend reevaluation.  MULU Lay PA-C

## 2020-08-07 ENCOUNTER — TELEPHONE (OUTPATIENT)
Dept: URGENT CARE | Facility: PHYSICIAN GROUP | Age: 60
End: 2020-08-07

## 2022-04-08 ENCOUNTER — HOSPITAL ENCOUNTER (OUTPATIENT)
Dept: RADIOLOGY | Facility: MEDICAL CENTER | Age: 62
End: 2022-04-08
Attending: PHYSICIAN ASSISTANT
Payer: COMMERCIAL

## 2022-04-08 ENCOUNTER — OFFICE VISIT (OUTPATIENT)
Dept: URGENT CARE | Facility: PHYSICIAN GROUP | Age: 62
End: 2022-04-08
Payer: COMMERCIAL

## 2022-04-08 VITALS
BODY MASS INDEX: 33.67 KG/M2 | SYSTOLIC BLOOD PRESSURE: 170 MMHG | DIASTOLIC BLOOD PRESSURE: 86 MMHG | RESPIRATION RATE: 16 BRPM | HEART RATE: 54 BPM | HEIGHT: 59 IN | WEIGHT: 167 LBS | TEMPERATURE: 98.3 F | OXYGEN SATURATION: 98 %

## 2022-04-08 DIAGNOSIS — S80.02XA CONTUSION OF LEFT KNEE, INITIAL ENCOUNTER: ICD-10-CM
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
## 2022-04-08 DIAGNOSIS — M25.462 KNEE EFFUSION, LEFT: ICD-10-CM

## 2022-04-08 PROCEDURE — 99214 OFFICE O/P EST MOD 30 MIN: CPT | Performed by: PHYSICIAN ASSISTANT

## 2022-04-08 PROCEDURE — 73564 X-RAY EXAM KNEE 4 OR MORE: CPT | Mod: LT

## 2022-04-08 ASSESSMENT — ENCOUNTER SYMPTOMS
BRUISES/BLEEDS EASILY: 0
LOSS OF CONSCIOUSNESS: 0
FALLS: 1
TINGLING: 0
MYALGIAS: 1

## 2022-04-08 ASSESSMENT — PAIN SCALES - GENERAL: PAINLEVEL: 6=MODERATE PAIN

## 2022-04-08 NOTE — PROGRESS NOTES
Subjective:     CHIEF COMPLAINT  Chief Complaint   Patient presents with   • Fall     Pt sts she was walking back into her house and missed the second step falling on her left knee. Onset 2 hours.        HPI  Sarah Schwartz is a 61 y.o. female who presents to the clinic after experiencing a ground-level fall and landing on her left knee.  Patient fell directly onto the concrete.  Currently has pain diffusely over her left knee.  Extremely painful to weight-bear.  Pain is generalized but seems to be most point tender to the inferior medial aspect of the left knee.  She has noticed some mild discoloration.  She has limitations in range of motion due to pain.  No numbness or tingling distally.  No prior injury or surgery to this knee.    REVIEW OF SYSTEMS  Review of Systems   Musculoskeletal: Positive for falls, joint pain and myalgias.   Neurological: Negative for tingling and loss of consciousness.   Endo/Heme/Allergies: Does not bruise/bleed easily.       PAST MEDICAL HISTORY  There are no problems to display for this patient.      SURGICAL HISTORY  patient denies any surgical history    ALLERGIES  No Known Allergies    CURRENT MEDICATIONS  Home Medications     Reviewed by Kayden Evans P.A.-C. (Physician Assistant) on 04/08/22 at 1615  Med List Status: <None>   Medication Last Dose Status   cetirizine (ZYRTEC ALLERGY) 10 MG Tab Not Taking Flagged for Removal   olopatadine HCl (PATADAY) 0.2 % ophthalmic solution Not Taking Flagged for Removal   polymixin-trimethoprim (POLYTRIM) 98896-8.1 UNIT/ML-% Solution Not Taking Flagged for Removal                SOCIAL HISTORY  Social History     Tobacco Use   • Smoking status: Never Smoker   • Smokeless tobacco: Never Used   Vaping Use   • Vaping Use: Never used   Substance and Sexual Activity   • Alcohol use: Not Currently   • Drug use: Never   • Sexual activity: Not on file       FAMILY HISTORY  History reviewed. No pertinent family history.       Objective:     VITAL SIGNS:  "BP (!) 170/86 (BP Location: Right arm, Patient Position: Sitting, BP Cuff Size: Adult)   Pulse (!) 54   Temp 36.8 °C (98.3 °F) (Temporal)   Resp 16   Ht 1.51 m (4' 11.45\")   Wt 75.8 kg (167 lb)   SpO2 98%   BMI 33.22 kg/m²     PHYSICAL EXAM  Physical Exam  Constitutional:       General: She is not in acute distress.     Appearance: Normal appearance. She is not ill-appearing, toxic-appearing or diaphoretic.   HENT:      Head: Normocephalic and atraumatic.   Eyes:      Conjunctiva/sclera: Conjunctivae normal.   Pulmonary:      Effort: Pulmonary effort is normal.   Musculoskeletal:      Cervical back: Normal range of motion.      Left knee: Swelling present. Decreased range of motion. Tenderness present.      Comments: Left knee: There is noticeable generalized edema.  Patient has limited flexion and extension due to pain.  Upon palpation she seems to be most tender to the inferior medial aspect of the left knee.  Minimal tenderness over the patella.  Patient does have tenderness to palpation over the quadriceps tendon.  There is no obvious deformity or step-off near the quadriceps tendon.  Patella does not seem to be high riding or low riding.  She is not able to fully extend the knee due to pain.  She is able to perform a straight leg raise however this does reproduce pain.  Limited knee flexion due to pain and tightness.  Antalgic gait.   Skin:     Capillary Refill: Capillary refill takes less than 2 seconds.   Neurological:      Mental Status: She is alert.       RADIOLOGY RESULTS   DX-KNEE COMPLETE 4+ LEFT    Result Date: 4/8/2022 4/8/2022 4:22 PM HISTORY/REASON FOR EXAM:  Left knee pain TECHNIQUE/EXAM DESCRIPTION AND NUMBER OF VIEWS:  4 views of the LEFT knee. COMPARISON: None FINDINGS: Bone density is normal.  There is no evidence of fracture or dislocation.  There is moderate joint space narrowing periarticular sclerosis and marginal spurring which is most prominent in the medial compartment.  There is " moderate joint effusion.     1.  No evidence of fracture or dislocation. 2.  Moderate suprapatellar effusion. 3.  Findings consistent with moderate osteoarthritis.           Assessment/Plan:     1. Contusion of left knee, initial encounter  - DX-KNEE COMPLETE 4+ LEFT; Future  - Referral to Sports Medicine    2. Knee effusion, left  - Referral to Sports Medicine    3. Fall, initial encounter  - DX-KNEE COMPLETE 4+ LEFT; Future  - Referral to Sports Medicine      MDM/Comments:    Patient sustained a ground-level fall with direct impact to the left knee.  In clinic she has moderate visible joint effusion.  Range of motion is also limited at this time due to pain.  She does demonstrate tenderness over the distal quadriceps tendon.  There is not seem to be any palpable deformity.  Patella does not seem to be low riding.  X-ray was reviewed with no evidence of fracture or bony abnormality.  Arthritic changes are present.  Patient currently unable to bear any weight.  We will place the patient in a Velcro knee brace for stability.  Patient is going to use her at home walker for ambulation.  Declined crutches.  Encouraged ice elevation and anti-inflammatories to decrease pain and swelling.  Placed referral to have her follow-up with our sports medicine team shall symptoms persist so that a repeat examination can be performed when she is less inflamed and to rule out any soft tissue injury.    Differential diagnosis, natural history, supportive care, and indications for immediate follow-up discussed. All questions answered. Patient agrees with the plan of care.    Follow-up as needed if symptoms worsen or fail to improve to PCP, Urgent care or Emergency Room.    I have personally reviewed prior external notes and test results pertinent to today's visit.  I have independently reviewed and interpreted all diagnostics ordered during this urgent care acute visit.   Discussed management options (risks,benefits, and alternatives  to treatment). Pt expresses understanding and the treatment plan was agreed upon. Questions were encouraged and answered to pt's satisfaction.    Please note that this dictation was created using voice recognition software. I have made a reasonable attempt to correct obvious errors, but I expect that there are errors of grammar and possibly content that I did not discover before finalizing the note.

## 2022-04-08 NOTE — LETTER
[facility] [address] [address]     April 8, 2022    Patient: Sarah Schwartz   YOB: 1960   Date of Visit: 4/8/2022       To Whom It May Concern:    Sarah Schwartz was seen and treated in our department on 4/8/2022.  Please excuse from work on 4/10/2022 through 4/12/2022.  Patient sustained a knee injury and is currently undergoing treatment.  Call with questions or concerns at [phone].    Sincerely,     Kayden Evans P.A.-C.

## 2022-07-05 ENCOUNTER — OFFICE VISIT (OUTPATIENT)
Dept: URGENT CARE | Facility: PHYSICIAN GROUP | Age: 62
End: 2022-07-05
Payer: COMMERCIAL

## 2022-07-05 VITALS
RESPIRATION RATE: 18 BRPM | SYSTOLIC BLOOD PRESSURE: 120 MMHG | TEMPERATURE: 97.8 F | WEIGHT: 164 LBS | BODY MASS INDEX: 30.96 KG/M2 | DIASTOLIC BLOOD PRESSURE: 78 MMHG | HEART RATE: 68 BPM | HEIGHT: 61 IN | OXYGEN SATURATION: 96 %

## 2022-07-05 DIAGNOSIS — I83.93 VARICOSE VEINS OF BOTH LOWER EXTREMITIES, UNSPECIFIED WHETHER COMPLICATED: ICD-10-CM

## 2022-07-05 PROCEDURE — 99213 OFFICE O/P EST LOW 20 MIN: CPT | Performed by: FAMILY MEDICINE

## 2022-07-05 NOTE — PROGRESS NOTES
"  Subjective:      61 y.o. female presents to urgent care for ruptured varicose veins. She has had varicose veins to her lower extremities bilaterally for about 15 years now. Although she doesn't remember any specific injury she noticed she had a small scab on the outside of her left ankle. Last night in the shower this scab came off and she had significant bleeding. Last night the patient's family called the ambulance, apparently the paramedics wrapped her ankle with a compression bandage and left. She did not bleed through the dressing last night, but still wants the area evaluated.    She denies any other questions or concerns at this time.    Current problem list, medication, and past medical/surgical history were reviewed in Epic.    ROS  See HPI     Objective:      /78 (BP Location: Left arm, Patient Position: Sitting, BP Cuff Size: Adult)   Pulse 68   Temp 36.6 °C (97.8 °F) (Temporal)   Resp 18   Ht 1.549 m (5' 1\")   Wt 74.4 kg (164 lb)   SpO2 96%   BMI 30.99 kg/m²     Physical Exam  Constitutional:       General: She is not in acute distress.     Appearance: She is not diaphoretic.   Cardiovascular:      Rate and Rhythm: Normal rate and regular rhythm.      Heart sounds: Normal heart sounds.   Pulmonary:      Effort: Pulmonary effort is normal. No respiratory distress.      Breath sounds: Normal breath sounds.   Skin:     Comments: Small, open lesion approximately 1 mm in diameter to the Achilles region of her left foot.  Hemostasis has been achieved.  Varicose veins present to lower extremities bilaterally.   Neurological:      Mental Status: She is alert.   Psychiatric:         Mood and Affect: Affect normal.         Judgment: Judgment normal.       Assessment/Plan:     1. Varicose veins of both lower extremities, unspecified whether complicated  No active bleeding at present.  We did clean and redress the wound.  Referral to vein [place] has been placed.  - Referral to Vascular " Medicine    Instructed to return to Urgent Care or nearest Emergency Department if symptoms fail to improve, for any change in condition, further concerns, or new concerning symptoms. Patient states understanding of the plan of care and discharge instructions.    Lacey Jordan M.D.

## 2022-07-13 ENCOUNTER — DOCUMENTATION (OUTPATIENT)
Dept: VASCULAR LAB | Facility: MEDICAL CENTER | Age: 62
End: 2022-07-13
Payer: COMMERCIAL

## 2022-10-13 ENCOUNTER — OFFICE VISIT (OUTPATIENT)
Dept: URGENT CARE | Facility: PHYSICIAN GROUP | Age: 62
End: 2022-10-13
Payer: COMMERCIAL

## 2022-10-13 VITALS
TEMPERATURE: 98.6 F | HEIGHT: 63 IN | DIASTOLIC BLOOD PRESSURE: 62 MMHG | RESPIRATION RATE: 16 BRPM | BODY MASS INDEX: 29.48 KG/M2 | OXYGEN SATURATION: 98 % | SYSTOLIC BLOOD PRESSURE: 90 MMHG | WEIGHT: 166.4 LBS | HEART RATE: 72 BPM

## 2022-10-13 DIAGNOSIS — Z20.822 COVID-19 RULED OUT: ICD-10-CM

## 2022-10-13 DIAGNOSIS — J02.9 VIRAL PHARYNGITIS: ICD-10-CM

## 2022-10-13 DIAGNOSIS — B30.9 ACUTE VIRAL CONJUNCTIVITIS OF RIGHT EYE: ICD-10-CM

## 2022-10-13 LAB
EXTERNAL QUALITY CONTROL: NORMAL
INT CON NEG: NORMAL
INT CON POS: NORMAL
SARS-COV+SARS-COV-2 AG RESP QL IA.RAPID: NEGATIVE

## 2022-10-13 PROCEDURE — 87426 SARSCOV CORONAVIRUS AG IA: CPT | Performed by: STUDENT IN AN ORGANIZED HEALTH CARE EDUCATION/TRAINING PROGRAM

## 2022-10-13 PROCEDURE — 99213 OFFICE O/P EST LOW 20 MIN: CPT | Mod: CS | Performed by: STUDENT IN AN ORGANIZED HEALTH CARE EDUCATION/TRAINING PROGRAM

## 2022-10-13 RX ORDER — FLUTICASONE PROPIONATE 50 MCG
2 SPRAY, SUSPENSION (ML) NASAL
Qty: 16 G | Refills: 1 | Status: SHIPPED | OUTPATIENT
Start: 2022-10-13 | End: 2023-03-30

## 2022-10-13 RX ORDER — CETIRIZINE HYDROCHLORIDE 10 MG/1
10 TABLET ORAL DAILY
COMMUNITY
End: 2023-03-30

## 2022-10-13 RX ORDER — DEXAMETHASONE SODIUM PHOSPHATE 10 MG/ML
10 INJECTION INTRAMUSCULAR; INTRAVENOUS ONCE
Status: COMPLETED | OUTPATIENT
Start: 2022-10-13 | End: 2022-10-13

## 2022-10-13 RX ORDER — IBUPROFEN 400 MG/1
400 TABLET ORAL EVERY 6 HOURS PRN
COMMUNITY
End: 2023-03-30

## 2022-10-13 RX ADMIN — DEXAMETHASONE SODIUM PHOSPHATE 10 MG: 10 INJECTION INTRAMUSCULAR; INTRAVENOUS at 16:09

## 2022-10-13 NOTE — PROGRESS NOTES
Subjective:   CHIEF COMPLAINT  Chief Complaint   Patient presents with    Conjunctivitis     Right eye, Red, swollen, very itching, pressure and painful x 1 day    Cough     Cough, sore throat, and phlegm x 2 days       HPI  Sarah Turner is a 62 y.o. female who presents with a chief complaint of Right eye itchiness, redness and discharge since yesterday.  Patient also reports for the last 2 days she has been experiencing a cough and sore throat.  Sore throat is her primary complaint has been persistent since onset.  She has tried a natural OTC eyedrop which is helped.  She is also tried Zyrtec-D which has not helped.  She is not experiencing any eye pain or change in visual acuity.  No pain with extraocular eye movement.  She does not wear contacts.  She is not wheezing or feeling short of breath.  She is vaccine against COVID x4.  No sick contacts.      Patient is accompanied by her daughter, who helps provide history.  English is not the patient's first language.    REVIEW OF SYSTEMS  General: no fever or chills  GI: no nausea or vomiting  See HPI for further details.    PAST MEDICAL HISTORY  There are no problems to display for this patient.      SURGICAL HISTORY  patient denies any surgical history    ALLERGIES  No Known Allergies    CURRENT MEDICATIONS  Home Medications       Reviewed by Christopher Phillips D.O. (Physician) on 10/14/22 at 1926  Med List Status: <None>     Medication Last Dose Status   cetirizine (ZYRTEC) 10 MG Tab  Active   fluticasone (FLONASE) 50 MCG/ACT nasal spray  Active   ibuprofen (MOTRIN) 400 MG Tab  Active                    SOCIAL HISTORY  Social History     Tobacco Use    Smoking status: Never    Smokeless tobacco: Never   Vaping Use    Vaping Use: Never used   Substance and Sexual Activity    Alcohol use: Not Currently    Drug use: Never    Sexual activity: Yes     Partners: Male     Birth control/protection: Female Sterilization       FAMILY HISTORY  History reviewed. No  "pertinent family history.       Objective:   PHYSICAL EXAM  VITAL SIGNS: BP (!) 90/62 (BP Location: Left arm, Patient Position: Sitting, BP Cuff Size: Small adult)   Pulse 72   Temp 37 °C (98.6 °F) (Temporal)   Resp 16   Ht 1.6 m (5' 3\")   Wt 75.5 kg (166 lb 6.4 oz)   SpO2 98%   BMI 29.48 kg/m²     Gen: no acute distress, normal voice  Skin: dry, intact, moist mucosal membranes  Eyes: Diffuse right conjunctival injection with presence of medial pterygium.  No hyphema.  Dried mucoserous discharge from the eyelashes. Minimal left conjunctival injection.  No pain with extraocular eye movement.  No photophobia with swinging flashlight test.  Neck: Normal range of motion. No meningeal signs.   ENT: Mild pharyngeal erythema with PND.  Uvula midline.  No exudates.  TMs clear and intact bilaterally without bulging, erythema or effusion.  Lungs: CTAB w/ symmetric expansion  CV: RRR w/o murmurs or clicks  Psych: normal affect, normal judgement, alert, awake    POC COVID: Negative    Assessment/Plan:     1. Viral pharyngitis  dexamethasone (DECADRON) injection (check route below) 10 mg    fluticasone (FLONASE) 50 MCG/ACT nasal spray      2. Acute viral conjunctivitis of right eye  dexamethasone (DECADRON) injection (check route below) 10 mg    fluticasone (FLONASE) 50 MCG/ACT nasal spray      3. COVID-19 ruled out  POCT SARS-COV Antigen [facility] Manual Result      Signs and symptoms consistent with a viral respiratory infection.  POC COVID testing today was negative.  - Ordered Decadron 10 mg p.o. x1  - Order Rx for Flonase  - Continue taking daily Zyrtec  - Return to urgent care any new/worsening symptoms or further questions or concerns.  Patient understood everything discussed.  All questions were answered.      Differential diagnosis, natural history, supportive care, and indications for immediate follow-up discussed. All questions answered. Patient agrees with the plan of care.    Follow-up as needed if symptoms worsen " or fail to improve to PCP, Urgent care or Emergency Room.    Please note that this dictation was created using voice recognition software. I have made a reasonable attempt to correct obvious errors, but I expect that there are errors of grammar and possibly content that I did not discover before finalizing the note.

## 2023-03-30 ENCOUNTER — OFFICE VISIT (OUTPATIENT)
Dept: URGENT CARE | Facility: PHYSICIAN GROUP | Age: 63
End: 2023-03-30
Payer: COMMERCIAL

## 2023-03-30 VITALS
HEART RATE: 65 BPM | RESPIRATION RATE: 16 BRPM | DIASTOLIC BLOOD PRESSURE: 75 MMHG | SYSTOLIC BLOOD PRESSURE: 135 MMHG | OXYGEN SATURATION: 96 % | WEIGHT: 166 LBS | TEMPERATURE: 97.9 F | HEIGHT: 63 IN | BODY MASS INDEX: 29.41 KG/M2

## 2023-03-30 DIAGNOSIS — R05.9 COUGH, UNSPECIFIED TYPE: ICD-10-CM

## 2023-03-30 DIAGNOSIS — L02.213 CUTANEOUS ABSCESS OF CHEST WALL: ICD-10-CM

## 2023-03-30 DIAGNOSIS — J31.0 RHINITIS, UNSPECIFIED TYPE: ICD-10-CM

## 2023-03-30 PROCEDURE — 99214 OFFICE O/P EST MOD 30 MIN: CPT | Performed by: STUDENT IN AN ORGANIZED HEALTH CARE EDUCATION/TRAINING PROGRAM

## 2023-03-30 RX ORDER — SULFAMETHOXAZOLE AND TRIMETHOPRIM 800; 160 MG/1; MG/1
1 TABLET ORAL 2 TIMES DAILY
Qty: 14 TABLET | Refills: 0 | Status: SHIPPED | OUTPATIENT
Start: 2023-03-30 | End: 2023-04-06

## 2023-03-30 RX ORDER — FLUTICASONE PROPIONATE 50 MCG
1 SPRAY, SUSPENSION (ML) NASAL DAILY
Qty: 16 G | Refills: 0 | Status: SHIPPED | OUTPATIENT
Start: 2023-03-30

## 2023-03-30 RX ORDER — AMOXICILLIN 500 MG/1
500 CAPSULE ORAL 2 TIMES DAILY
Qty: 14 CAPSULE | Refills: 0 | Status: SHIPPED | OUTPATIENT
Start: 2023-03-30 | End: 2023-04-06

## 2023-03-30 RX ORDER — BENZONATATE 100 MG/1
100 CAPSULE ORAL 3 TIMES DAILY PRN
Qty: 60 CAPSULE | Refills: 0 | Status: SHIPPED | OUTPATIENT
Start: 2023-03-30

## 2023-03-30 ASSESSMENT — ENCOUNTER SYMPTOMS
FEVER: 0
ROS SKIN COMMENTS: POSITIVE FOR ABSCESS.
NAUSEA: 0
DIZZINESS: 0
DIARRHEA: 0
HEADACHES: 0
PALPITATIONS: 0
CHILLS: 0
ABDOMINAL PAIN: 0
COUGH: 1
CONSTIPATION: 0
SHORTNESS OF BREATH: 0
SORE THROAT: 0
VOMITING: 0
WHEEZING: 0

## 2023-03-30 NOTE — PROGRESS NOTES
"Subjective     Sarah Turner is a 62 y.o. female who presents with Abscess (Left side of chest, painful. X1 week. ) and Cough (X1 week. )            Sarah is a 62 y.o. female who presents to urgent care for abscess on her left chest.  Patient states that she noticed it approximately 1 week ago.  Patient states that abscess was increasing in size but spontaneously ruptured yesterday.  Since spontaneous rupture it has not increased in size and redness.  Pain is also improved after spontaneous rupture.  Patient does note some redness and warmth still present.  Patient denies fever/chills.  Patient states she has been applying topical antibiotic at home and using gauze to cover wound.  Patient also brings up concern for cough.  Cough started approximately 1 week ago.  No URI-like symptoms.  No shortness of breath/wheezing.  Patient has had a runny nose.  Reports using nasal spray which has helped.  Patient states she thinks cough is secondary to irritant from work.  Patient also states she has had allergies in the past.      Review of Systems   Constitutional:  Negative for chills, fever and malaise/fatigue.   HENT:  Negative for congestion, ear pain and sore throat.    Respiratory:  Positive for cough. Negative for shortness of breath and wheezing.    Cardiovascular:  Negative for chest pain and palpitations.   Gastrointestinal:  Negative for abdominal pain, constipation, diarrhea, nausea and vomiting.   Skin:         Positive for abscess.   Neurological:  Negative for dizziness and headaches.   All other systems reviewed and are negative.           Objective     BP (!) 170/80 (BP Location: Left arm, Patient Position: Sitting, BP Cuff Size: Adult)   Pulse 65   Temp 36.6 °C (97.9 °F) (Temporal)   Resp 16   Ht 1.6 m (5' 3\")   Wt 75.3 kg (166 lb)   SpO2 96%   BMI 29.41 kg/m²      Physical Exam  Vitals reviewed.   Constitutional:       General: She is not in acute distress.     Appearance: Normal appearance. She " is not ill-appearing, toxic-appearing or diaphoretic.   HENT:      Head: Normocephalic and atraumatic.      Right Ear: Tympanic membrane, ear canal and external ear normal.      Left Ear: Tympanic membrane, ear canal and external ear normal.      Mouth/Throat:      Mouth: Mucous membranes are moist.      Pharynx: Oropharynx is clear.   Eyes:      Extraocular Movements: Extraocular movements intact.      Conjunctiva/sclera: Conjunctivae normal.      Pupils: Pupils are equal, round, and reactive to light.   Cardiovascular:      Rate and Rhythm: Normal rate and regular rhythm.   Pulmonary:      Effort: Pulmonary effort is normal.      Breath sounds: Normal breath sounds.   Skin:     General: Skin is warm and dry.             Comments: Approx. 3-4cm cutaneous abscess on left chest. Central crusting from spontaneous drainage. Surrounding erythema and swelling.   Neurological:      Mental Status: She is alert.                           Assessment & Plan        1. Cutaneous abscess of chest wall  - amoxicillin (AMOXIL) 500 MG Cap; Take 1 Capsule by mouth 2 times a day for 7 days.  Dispense: 14 Capsule; Refill: 0  - sulfamethoxazole-trimethoprim (BACTRIM DS) 800-160 MG tablet; Take 1 Tablet by mouth 2 times a day for 7 days.  Dispense: 14 Tablet; Refill: 0  - Abscess has already started to spontaneously drain on own. Advised on warm compresses at home. Sterile dressing applied. Extra supplies provided for dressing changes at home. Wound care instructions discussed.    2. Cough, unspecified type  - benzonatate (TESSALON) 100 MG Cap; Take 1 Capsule by mouth 3 times a day as needed for Cough.  Dispense: 60 Capsule; Refill: 0    3. Rhinitis, unspecified type  - fluticasone (FLONASE) 50 MCG/ACT nasal spray; Administer 1 Spray into affected nostril(S) every day.  Dispense: 16 g; Refill: 0     Differential diagnoses, supportive care, measures, wound care instructions and indications for immediate follow-up discussed with patient.  Pathogenesis of diagnosis discussed including typical length and natural progression.      Instructed to return to urgent care or nearest emergency department if symptoms fail to improve, for any change in condition, further concerns, or new concerning symptoms.    Patient states understanding and agrees with the plan of care and discharge instructions.